# Patient Record
Sex: MALE | Race: WHITE | NOT HISPANIC OR LATINO | URBAN - METROPOLITAN AREA
[De-identification: names, ages, dates, MRNs, and addresses within clinical notes are randomized per-mention and may not be internally consistent; named-entity substitution may affect disease eponyms.]

---

## 2019-03-12 ENCOUNTER — INPATIENT (INPATIENT)
Facility: HOSPITAL | Age: 49
LOS: 2 days | Discharge: HOME | End: 2019-03-15
Attending: INTERNAL MEDICINE | Admitting: INTERNAL MEDICINE

## 2019-03-12 VITALS
SYSTOLIC BLOOD PRESSURE: 125 MMHG | OXYGEN SATURATION: 97 % | RESPIRATION RATE: 20 BRPM | DIASTOLIC BLOOD PRESSURE: 73 MMHG | HEART RATE: 42 BPM | TEMPERATURE: 96 F

## 2019-03-12 DIAGNOSIS — Z98.890 OTHER SPECIFIED POSTPROCEDURAL STATES: Chronic | ICD-10-CM

## 2019-03-12 LAB
ALBUMIN SERPL ELPH-MCNC: 4.5 G/DL — SIGNIFICANT CHANGE UP (ref 3.5–5.2)
ALP SERPL-CCNC: 62 U/L — SIGNIFICANT CHANGE UP (ref 30–115)
ALT FLD-CCNC: 21 U/L — SIGNIFICANT CHANGE UP (ref 0–41)
ANION GAP SERPL CALC-SCNC: 9 MMOL/L — SIGNIFICANT CHANGE UP (ref 7–14)
AST SERPL-CCNC: 26 U/L — SIGNIFICANT CHANGE UP (ref 0–41)
BASE EXCESS BLDV CALC-SCNC: 1 MMOL/L — SIGNIFICANT CHANGE UP (ref -2–2)
BASOPHILS # BLD AUTO: 0.03 K/UL — SIGNIFICANT CHANGE UP (ref 0–0.2)
BASOPHILS NFR BLD AUTO: 0.6 % — SIGNIFICANT CHANGE UP (ref 0–1)
BILIRUB SERPL-MCNC: 0.5 MG/DL — SIGNIFICANT CHANGE UP (ref 0.2–1.2)
BUN SERPL-MCNC: 23 MG/DL — HIGH (ref 10–20)
CA-I SERPL-SCNC: SIGNIFICANT CHANGE UP MMOL/L (ref 1.12–1.3)
CALCIUM SERPL-MCNC: 9.1 MG/DL — SIGNIFICANT CHANGE UP (ref 8.5–10.1)
CHLORIDE SERPL-SCNC: 104 MMOL/L — SIGNIFICANT CHANGE UP (ref 98–110)
CO2 SERPL-SCNC: 24 MMOL/L — SIGNIFICANT CHANGE UP (ref 17–32)
CREAT SERPL-MCNC: 1.1 MG/DL — SIGNIFICANT CHANGE UP (ref 0.7–1.5)
D DIMER BLD IA.RAPID-MCNC: 134 NG/ML DDU — SIGNIFICANT CHANGE UP (ref 0–230)
EOSINOPHIL # BLD AUTO: 0.02 K/UL — SIGNIFICANT CHANGE UP (ref 0–0.7)
EOSINOPHIL NFR BLD AUTO: 0.4 % — SIGNIFICANT CHANGE UP (ref 0–8)
GAS PNL BLDV: 139 MMOL/L — SIGNIFICANT CHANGE UP (ref 136–145)
GAS PNL BLDV: SIGNIFICANT CHANGE UP
GLUCOSE SERPL-MCNC: 162 MG/DL — HIGH (ref 70–99)
HCO3 BLDV-SCNC: 29 MMOL/L — SIGNIFICANT CHANGE UP (ref 22–29)
HCT VFR BLD CALC: 43.8 % — SIGNIFICANT CHANGE UP (ref 42–52)
HCT VFR BLDA CALC: 47.1 % — HIGH (ref 34–44)
HGB BLD CALC-MCNC: 15.4 G/DL — SIGNIFICANT CHANGE UP (ref 14–18)
HGB BLD-MCNC: 14.7 G/DL — SIGNIFICANT CHANGE UP (ref 14–18)
IMM GRANULOCYTES NFR BLD AUTO: 0.2 % — SIGNIFICANT CHANGE UP (ref 0.1–0.3)
LACTATE BLDV-MCNC: SIGNIFICANT CHANGE UP MMOL/L (ref 0.5–1.6)
LYMPHOCYTES # BLD AUTO: 0.75 K/UL — LOW (ref 1.2–3.4)
LYMPHOCYTES # BLD AUTO: 14.9 % — LOW (ref 20.5–51.1)
MAGNESIUM SERPL-MCNC: 1.8 MG/DL — SIGNIFICANT CHANGE UP (ref 1.8–2.4)
MCHC RBC-ENTMCNC: 29.9 PG — SIGNIFICANT CHANGE UP (ref 27–31)
MCHC RBC-ENTMCNC: 33.6 G/DL — SIGNIFICANT CHANGE UP (ref 32–37)
MCV RBC AUTO: 89.2 FL — SIGNIFICANT CHANGE UP (ref 80–94)
MONOCYTES # BLD AUTO: 0.32 K/UL — SIGNIFICANT CHANGE UP (ref 0.1–0.6)
MONOCYTES NFR BLD AUTO: 6.3 % — SIGNIFICANT CHANGE UP (ref 1.7–9.3)
NEUTROPHILS # BLD AUTO: 3.92 K/UL — SIGNIFICANT CHANGE UP (ref 1.4–6.5)
NEUTROPHILS NFR BLD AUTO: 77.6 % — HIGH (ref 42.2–75.2)
NRBC # BLD: 0 /100 WBCS — SIGNIFICANT CHANGE UP (ref 0–0)
NT-PROBNP SERPL-SCNC: 57 PG/ML — SIGNIFICANT CHANGE UP (ref 0–300)
PCO2 BLDV: 58 MMHG — HIGH (ref 41–51)
PH BLDV: 7.3 — SIGNIFICANT CHANGE UP (ref 7.26–7.43)
PLATELET # BLD AUTO: 192 K/UL — SIGNIFICANT CHANGE UP (ref 130–400)
PO2 BLDV: 24 MMHG — SIGNIFICANT CHANGE UP (ref 20–40)
POTASSIUM BLDV-SCNC: 4.5 MMOL/L — SIGNIFICANT CHANGE UP (ref 3.3–5.6)
POTASSIUM SERPL-MCNC: 5 MMOL/L — SIGNIFICANT CHANGE UP (ref 3.5–5)
POTASSIUM SERPL-SCNC: 5 MMOL/L — SIGNIFICANT CHANGE UP (ref 3.5–5)
PROT SERPL-MCNC: 6.8 G/DL — SIGNIFICANT CHANGE UP (ref 6–8)
RBC # BLD: 4.91 M/UL — SIGNIFICANT CHANGE UP (ref 4.7–6.1)
RBC # FLD: 12.5 % — SIGNIFICANT CHANGE UP (ref 11.5–14.5)
SAO2 % BLDV: 40 % — SIGNIFICANT CHANGE UP
SODIUM SERPL-SCNC: 137 MMOL/L — SIGNIFICANT CHANGE UP (ref 135–146)
TROPONIN T SERPL-MCNC: <0.01 NG/ML — SIGNIFICANT CHANGE UP
WBC # BLD: 5.05 K/UL — SIGNIFICANT CHANGE UP (ref 4.8–10.8)
WBC # FLD AUTO: 5.05 K/UL — SIGNIFICANT CHANGE UP (ref 4.8–10.8)

## 2019-03-12 RX ORDER — ACETAMINOPHEN 500 MG
650 TABLET ORAL EVERY 6 HOURS
Qty: 0 | Refills: 0 | Status: DISCONTINUED | OUTPATIENT
Start: 2019-03-12 | End: 2019-03-15

## 2019-03-12 RX ORDER — SODIUM CHLORIDE 9 MG/ML
1000 INJECTION, SOLUTION INTRAVENOUS ONCE
Qty: 0 | Refills: 0 | Status: COMPLETED | OUTPATIENT
Start: 2019-03-12 | End: 2019-03-12

## 2019-03-12 RX ORDER — ENOXAPARIN SODIUM 100 MG/ML
40 INJECTION SUBCUTANEOUS EVERY 24 HOURS
Qty: 0 | Refills: 0 | Status: DISCONTINUED | OUTPATIENT
Start: 2019-03-12 | End: 2019-03-15

## 2019-03-12 RX ADMIN — ENOXAPARIN SODIUM 40 MILLIGRAM(S): 100 INJECTION SUBCUTANEOUS at 21:47

## 2019-03-12 RX ADMIN — Medication 650 MILLIGRAM(S): at 20:10

## 2019-03-12 RX ADMIN — Medication 650 MILLIGRAM(S): at 21:26

## 2019-03-12 RX ADMIN — SODIUM CHLORIDE 2000 MILLILITER(S): 9 INJECTION, SOLUTION INTRAVENOUS at 10:46

## 2019-03-12 NOTE — ED ADULT NURSE NOTE - NSIMPLEMENTINTERV_GEN_ALL_ED
Implemented All Fall with Harm Risk Interventions:  [place] to call system. Call bell, personal items and telephone within reach. Instruct patient to call for assistance. Room bathroom lighting operational. Non-slip footwear when patient is off stretcher. Physically safe environment: no spills, clutter or unnecessary equipment. Stretcher in lowest position, wheels locked, appropriate side rails in place. Provide visual cue, wrist band, yellow gown, etc. Monitor gait and stability. Monitor for mental status changes and reorient to person, place, and time. Review medications for side effects contributing to fall risk. Reinforce activity limits and safety measures with patient and family. Provide visual clues: red socks.

## 2019-03-12 NOTE — H&P ADULT - HISTORY OF PRESENT ILLNESS
This is a 48 Yo M, [facility] syncopal episodes 2015 and 2003 who presents for 3 episodes of LOC this AM. He states he was urinating soon after waking up this morning when he suddenly found himself on the floor of the bathroom, he thinks it was approximately 1 minute later. He denies any prodromal symptoms, any urinary or fecal incontinence, postictal confusion, and tongue biting. A few hours later he was taking a shower when he found himself of the floor of the shower, which was similar to the initial episode. 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 He denies any seizure-like movements, but reports that after a minute he came to but was mildly confused, and wasn't sure what was happening. After a few minutes he was more coherent and they came to the hospital.   The patient denies any recent illness, sick contacts, fevers, chills, cough, SOB, chest pain, SOB/MASTERSON, palpitations, abdominal pain, nausea, vomiting diarrhea, LE edema, urinary complaints, neurological complaints, headaches, dizziness, and denies any family history of CAD, sudden cardiac death or passing out, drowning accidents. He takes no medications, supplements, drinks red bull daily but otherwise no excessive caffeine. In the ED was found to have bradycardia 30s-40s, and ECG showed bradycardia 1st deg AV block. Currently the pt is asymptomatic and denies any and all complaints.    Vital Signs Last 24 Hrs  T(C): 36.7 (12 Mar 2019 15:24), Max: 36.7 (12 Mar 2019 15:24)  T(F): 98 (12 Mar 2019 15:24), Max: 98 (12 Mar 2019 15:24)  HR: 66 (12 Mar 2019 15:24) (42 - 66)  BP: 121/62 (12 Mar 2019 15:24) (121/62 - 125/73)  BP(mean): --  RR: 18 (12 Mar 2019 15:24) (18 - 20)  SpO2: 99% (12 Mar 2019 15:24) (97% - 99%)

## 2019-03-12 NOTE — H&P ADULT - NSHPPHYSICALEXAM_GEN_ALL_CORE
PHYSICAL EXAM:  GENERAL: Middle aged M, sitting in bed, well appearing, in NAD  HEAD:  Atraumatic, Normocephalic  EYES: EOMI, PERRLA, conjunctiva and sclera clear  NECK: Supple, No JVD, no carotid bruit  CHEST/LUNG: Clear to auscultation bilaterally; No wheeze, rales, rhonchi  HEART: Regular rate and rhythm; No murmurs, rubs, or gallops, S1 S2 present  ABDOMEN: Soft, Nontender, Nondistended; Bowel sounds present  EXTREMITIES:  2+ Peripheral Pulses, No clubbing, cyanosis, or edema  PSYCH: AAOx3  NEUROLOGY: non-focal, CN II-XII intact  SKIN: No rashes or lesions

## 2019-03-12 NOTE — ED PROVIDER NOTE - CLINICAL SUMMARY MEDICAL DECISION MAKING FREE TEXT BOX
pt p/w multiple syncopal episodes in ED. EKG w/ sinus isela, 1 AVB. CTH negative for intracranial pathology. dimer negative - low risk for PE/dissection. trop negative, less likely acs. pt w/ episode of symptomatic bradycardia in ED to 30s, will admit for further w/u, monitoring on tele

## 2019-03-12 NOTE — ED ADULT NURSE NOTE - OBJECTIVE STATEMENT
patient states he fell early this morning while getting ready for work, does not remember why. also states while at work he was sitting in the car and passed out. episode was witnessed by coworker who states patient's eyes rolled and back and patient became unresponsive for approx one minute. patient does not remember anything except waking up. patient is denying chest pain/sob

## 2019-03-12 NOTE — ED PROVIDER NOTE - NS ED ROS FT
Constitutional: NAD  Eyes: No visual changes, eye pain or discharge.  ENMT: No hearing changes, pain, discharge or infections. No neck pain or stiffness.  Cardiac: No chest pain, SOB or edema. No chest pain with exertion.  Respiratory: No cough or respiratory distress.   GI: No nausea, vomiting, diarrhea or abdominal pain.  : No dysuria, frequency or burning.  MS: No myalgia, muscle weakness, joint pain or back pain.  Neuro: No headache or weakness. + LOC.  Skin: No skin rash.  Heme: No bruising

## 2019-03-12 NOTE — ED PROVIDER NOTE - OBJECTIVE STATEMENT
49M no significant pmh p/f 3 syncopal episodes this morning. Pt reports syncopizing while going to the bathrroom early this morning, then again while in the shower and for a third time while sitting in the passenger seat of his car after picking up his boss for work. Pt states he hurt his L foot after falling in the shower but doesn't think he struck his head. Pts boss reports that pt lost consciousness for 1-2 minutes in the car and that he was confused for approximately 10-15 after as he drove him to the hospital. Pt denies prodromal symptoms, f/c, HA, neck pain, CP, SOB, abd pain, back pain, calf pain/swelling, hx of seizures,  hx of sudden death in family

## 2019-03-12 NOTE — ED ADULT TRIAGE NOTE - CHIEF COMPLAINT QUOTE
Pt had 2 syncopal episodes today, unwitnessed, once in shower and once while driving. Pt pale, diaphoretic.

## 2019-03-12 NOTE — ED PROVIDER NOTE - ATTENDING CONTRIBUTION TO CARE
50 yo m hx syncope in past (~16 yrs ago and 4 yrs ago) here for syncope. pt w/ 3 episodes of syncope. no prodromal sx. no cp, sob, palpitations, ha. pt states he hurt his L foot when he fell in the shower. no ha/neck pain. pt denies hx sz of focal weakness. pt did feel "confused for 10 minutes." no recent flight/travel/calf swelling or tenderness    vss  gen- NAD, aaox3  card-rrr  lungs-ctab, no wheezing or rhonchi  abd-sntnd, no guarding or rebound  neuro- full str/sensation, cn ii-xii grossly intact, normal coordination    r/o acs, lyte abnormality anemic, intracraneal process

## 2019-03-12 NOTE — H&P ADULT - NSHPSOCIALHISTORY_GEN_ALL_CORE
Denies smoking, drinking, drug abuse.  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

## 2019-03-12 NOTE — ED PROVIDER NOTE - PHYSICAL EXAMINATION
General: AOx4, Non toxic appearing, NAD, speaking in full sentences.   Skin: Pale, warm and dry, no acute rash.   Head:  Normocephalic, atraumatic.   EENT: PERRL/EOMI, conjunctiva and sclera clear. MM moist, no nasal discharge.   Neck: Supple nt, no meningeal signs. No midline ttp.   Lymph: No acute cervical lymphadenopathy  Heart isela, regular, s1s2 nl, no rub/murmur.   Lungs- No retractions, BS equal, CTAB.   Abdomen: Soft ntnd no r/g.   Extremities- moves all, +equal distal pulses, brisk cap refill. No LE edema, calves nttp b/l. small abrasion to medial aspect of L forefoot  Neuro: Sensation wnl, CN 2-12 grossly intact. +5/5 muscle strength throughout.  Psych: Cooperative, appropriate

## 2019-03-12 NOTE — H&P ADULT - ASSESSMENT
48 YO M with PMH of syncopal episodes in the past presents with 3 episodes of LOC. CT head, CXR negative, no witnessed seizure activity although the one episode that was witnessed had a facial grimace and some confusion after the event. Was isela in the ED with first deg  AV block. Labs WNL. Likely arrhythmia, no family Hx of arrhythmias or sudden cardiac death.    1) Loss of consciousness: Likely syncope 2/2 arrhythmias vs other causes of syncope (neuro, orthostatic, cardiac disease...) vs seizures (less likely)   - Tele monitoring  - Cardio c/s (will likely need EP workup for symptomatic bradycardia and possible ppm)  - FU TTE, EEG, repeat ECG, orthostatics  - EPS eval/Neuro eval if above tests are abnormal    VTE PPx  Regular diet  Full code  From home, fully functional

## 2019-03-12 NOTE — H&P ADULT - NSHPLABSRESULTS_GEN_ALL_CORE
14.7   5.05  )-----------( 192      ( 12 Mar 2019 10:58 )             43.8       03-12    137  |  104  |  23<H>  ----------------------------<  162<H>  5.0   |  24  |  1.1    Ca    9.1      12 Mar 2019 10:58  Mg     1.8     03-12    TPro  6.8  /  Alb  4.5  /  TBili  0.5  /  DBili  x   /  AST  26  /  ALT  21  /  AlkPhos  62  03-12            CARDIAC MARKERS ( 12 Mar 2019 10:58 )  x     / <0.01 ng/mL / x     / x     / x          POCT Blood Glucose.: 135 mg/dL (12 Mar 2019 10:49)      10:54 - VBG - pH: 7.30  | pCO2: 58    | pO2: 24    | Lactate: ?1.2       03-12 @ 11:29  134 ng/mL DDU [0 - 230]    < from: CT Head No Cont (03.12.19 @ 11:03) >      IMPRESSION:     No acute fractures, mass effect, midline shift or hemorrhage.     < end of copied text >    < from: Xray Chest 1 View-PORTABLE IMMEDIATE (03.12.19 @ 11:15) >    Impression:      No consolidation, effusion orpneumothorax.    < end of copied text >    < from: 12 Lead ECG (03.12.19 @ 10:20) >      Ventricular Rate 42 BPM    Atrial Rate 42 BPM    P-R Interval 224 ms    QRS Duration 92 ms    Q-T Interval 456 ms    QTC Calculation(Bezet) 380 ms    P Axis 39 degrees    R Axis 33 degrees    T Axis 26 degrees    Diagnosis Line Marked sinus bradycardia with sinus arrhythmia with 1st degree A-V block  Abnormal ECG    < end of copied text >

## 2019-03-12 NOTE — H&P ADULT - ATTENDING COMMENTS
pt seen and examined independently.    49M who presents with syncopal episodes x3.  pt says that the first one was this morning, he was standing and using the bathroom, and the next thing he knew he was on the ground.  The second time was in the shower, the third time he was in a car and this was witnessed by his boss.  His boss reports that pt was out for about 30-40 seconds, and when he came to, was a little confused for about 5 minutes.  no tongue biting or incontinence.  +LOC.  pt denies any symptoms prior to passing out like chest pain, palpitations, or dizziness.  no sob.  Pt also says that about 3.5 years ago, he had a passing out episode and was in the hospital where he had pads placed on him and was shocked a few times.  he says he had a normal ECHO, stress, and tilt table test.  he does report some mild Lt foot pain from falling, but is able to ambulate fine.  In the ER, pt was on the tele monitor and he felt like he was going to pass out, on the monitor the heart rate dropped into the 30's per patient.  no medications/no new meds.  comprehensive ROS otherwise negative except those mentioned in the HPI  PMH/FMHx/SHx as above    VS:  stable, HR low 60's on monitor  GEN:  NAD, otherwise healthy appearing male  HEENT:  EOMI, PERRL, MMM  CVS:  RRR, no MRG, no edema  lungs:  CTA b/l  abd:  soft, NT/ND +bs  neuro:  AOx3, no focal deficits  skin:  grossly intact, no rashes    CXR and head CT non-acute  EKG:  sinus isela 42bpm, 1st degree AVB, no acute ST/TW changes    #recurrent syncope - ?symptomatic bradycardia.  continue tele monitoring, EP eval, ECHO.  check orthostatics.  EEG.  may need event montior    PPx - lovenox    Progress Note Handoff  Pending:  cardiology eval  Patient/Family discussion: d/w patient  Disposition: home pt seen and examined independently.    49M who presents with syncopal episodes x3.  pt says that the first one was this morning, he was standing and using the bathroom, and the next thing he knew he was on the ground.  The second time was in the shower, the third time he was in a car and this was witnessed by his boss.  His boss reports that pt was out for about 30-40 seconds, and when he came to, was a little confused for about 5 minutes.  no tongue biting or incontinence.  +LOC.  pt denies any symptoms prior to passing out like chest pain, palpitations, or dizziness.  no sob.  Pt also says that about 3.5 years ago, he had a passing out episode and was in the hospital where he had pacer pads placed on him, ?felt he was being paced.  he says he had a normal ECHO, stress, and tilt table test.  he does report some mild Lt foot pain from falling, but is able to ambulate fine.  In the ER, pt was on the tele monitor and he felt like he was going to pass out, on the monitor the heart rate dropped into the 30's per patient.  no medications/no new meds.  comprehensive ROS otherwise negative except those mentioned in the HPI  PMH/FMHx/SHx as above    VS:  stable, HR low 60's on monitor  GEN:  NAD, otherwise healthy appearing male  HEENT:  EOMI, PERRL, MMM  CVS:  RRR, no MRG, no edema  lungs:  CTA b/l  abd:  soft, NT/ND +bs  neuro:  AOx3, no focal deficits  skin:  grossly intact, no rashes    CXR and head CT non-acute  EKG:  sinus isela 42bpm, 1st degree AVB, no acute ST/TW changes    #recurrent syncope - ?symptomatic bradycardia.  continue tele monitoring, EP eval, ECHO.  check orthostatics.  EEG.  may need event monitor    PPx - lovenox    Progress Note Handoff  Pending:  cardiology eval  Patient/Family discussion: d/w patient  Disposition: home    total time spent 35min

## 2019-03-13 LAB
ALBUMIN SERPL ELPH-MCNC: 4.1 G/DL — SIGNIFICANT CHANGE UP (ref 3.5–5.2)
ALP SERPL-CCNC: 56 U/L — SIGNIFICANT CHANGE UP (ref 30–115)
ALT FLD-CCNC: 19 U/L — SIGNIFICANT CHANGE UP (ref 0–41)
AMPHET UR-MCNC: NEGATIVE — SIGNIFICANT CHANGE UP
ANION GAP SERPL CALC-SCNC: 12 MMOL/L — SIGNIFICANT CHANGE UP (ref 7–14)
AST SERPL-CCNC: 23 U/L — SIGNIFICANT CHANGE UP (ref 0–41)
BARBITURATES UR SCN-MCNC: NEGATIVE — SIGNIFICANT CHANGE UP
BASOPHILS # BLD AUTO: 0.04 K/UL — SIGNIFICANT CHANGE UP (ref 0–0.2)
BASOPHILS NFR BLD AUTO: 0.6 % — SIGNIFICANT CHANGE UP (ref 0–1)
BENZODIAZ UR-MCNC: NEGATIVE — SIGNIFICANT CHANGE UP
BILIRUB DIRECT SERPL-MCNC: <0.2 MG/DL — SIGNIFICANT CHANGE UP (ref 0–0.2)
BILIRUB INDIRECT FLD-MCNC: >0.5 MG/DL — SIGNIFICANT CHANGE UP (ref 0.2–1.2)
BILIRUB SERPL-MCNC: 0.7 MG/DL — SIGNIFICANT CHANGE UP (ref 0.2–1.2)
BUN SERPL-MCNC: 14 MG/DL — SIGNIFICANT CHANGE UP (ref 10–20)
CALCIUM SERPL-MCNC: 9.2 MG/DL — SIGNIFICANT CHANGE UP (ref 8.5–10.1)
CHLORIDE SERPL-SCNC: 107 MMOL/L — SIGNIFICANT CHANGE UP (ref 98–110)
CK MB CFR SERPL CALC: 1.8 NG/ML — SIGNIFICANT CHANGE UP (ref 0.6–6.3)
CK SERPL-CCNC: 119 U/L — SIGNIFICANT CHANGE UP (ref 0–225)
CO2 SERPL-SCNC: 25 MMOL/L — SIGNIFICANT CHANGE UP (ref 17–32)
COCAINE METAB.OTHER UR-MCNC: NEGATIVE — SIGNIFICANT CHANGE UP
CREAT SERPL-MCNC: 1.1 MG/DL — SIGNIFICANT CHANGE UP (ref 0.7–1.5)
EOSINOPHIL # BLD AUTO: 0.07 K/UL — SIGNIFICANT CHANGE UP (ref 0–0.7)
EOSINOPHIL NFR BLD AUTO: 1 % — SIGNIFICANT CHANGE UP (ref 0–8)
GLUCOSE SERPL-MCNC: 94 MG/DL — SIGNIFICANT CHANGE UP (ref 70–99)
HCT VFR BLD CALC: 43.6 % — SIGNIFICANT CHANGE UP (ref 42–52)
HGB BLD-MCNC: 14.6 G/DL — SIGNIFICANT CHANGE UP (ref 14–18)
IMM GRANULOCYTES NFR BLD AUTO: 0.1 % — SIGNIFICANT CHANGE UP (ref 0.1–0.3)
LYMPHOCYTES # BLD AUTO: 1.93 K/UL — SIGNIFICANT CHANGE UP (ref 1.2–3.4)
LYMPHOCYTES # BLD AUTO: 28.5 % — SIGNIFICANT CHANGE UP (ref 20.5–51.1)
MAGNESIUM SERPL-MCNC: 2 MG/DL — SIGNIFICANT CHANGE UP (ref 1.8–2.4)
MCHC RBC-ENTMCNC: 29.9 PG — SIGNIFICANT CHANGE UP (ref 27–31)
MCHC RBC-ENTMCNC: 33.5 G/DL — SIGNIFICANT CHANGE UP (ref 32–37)
MCV RBC AUTO: 89.3 FL — SIGNIFICANT CHANGE UP (ref 80–94)
METHADONE UR-MCNC: NEGATIVE — SIGNIFICANT CHANGE UP
MONOCYTES # BLD AUTO: 0.59 K/UL — SIGNIFICANT CHANGE UP (ref 0.1–0.6)
MONOCYTES NFR BLD AUTO: 8.7 % — SIGNIFICANT CHANGE UP (ref 1.7–9.3)
NEUTROPHILS # BLD AUTO: 4.13 K/UL — SIGNIFICANT CHANGE UP (ref 1.4–6.5)
NEUTROPHILS NFR BLD AUTO: 61.1 % — SIGNIFICANT CHANGE UP (ref 42.2–75.2)
NRBC # BLD: 0 /100 WBCS — SIGNIFICANT CHANGE UP (ref 0–0)
OPIATES UR-MCNC: NEGATIVE — SIGNIFICANT CHANGE UP
PCP SPEC-MCNC: SIGNIFICANT CHANGE UP
PLATELET # BLD AUTO: 189 K/UL — SIGNIFICANT CHANGE UP (ref 130–400)
POTASSIUM SERPL-MCNC: 4.3 MMOL/L — SIGNIFICANT CHANGE UP (ref 3.5–5)
POTASSIUM SERPL-SCNC: 4.3 MMOL/L — SIGNIFICANT CHANGE UP (ref 3.5–5)
PROPOXYPHENE QUALITATIVE URINE RESULT: NEGATIVE — SIGNIFICANT CHANGE UP
PROT SERPL-MCNC: 6.2 G/DL — SIGNIFICANT CHANGE UP (ref 6–8)
RBC # BLD: 4.88 M/UL — SIGNIFICANT CHANGE UP (ref 4.7–6.1)
RBC # FLD: 12.6 % — SIGNIFICANT CHANGE UP (ref 11.5–14.5)
SODIUM SERPL-SCNC: 144 MMOL/L — SIGNIFICANT CHANGE UP (ref 135–146)
TROPONIN T SERPL-MCNC: <0.01 NG/ML — SIGNIFICANT CHANGE UP
WBC # BLD: 6.77 K/UL — SIGNIFICANT CHANGE UP (ref 4.8–10.8)
WBC # FLD AUTO: 6.77 K/UL — SIGNIFICANT CHANGE UP (ref 4.8–10.8)

## 2019-03-13 RX ADMIN — ENOXAPARIN SODIUM 40 MILLIGRAM(S): 100 INJECTION SUBCUTANEOUS at 21:27

## 2019-03-13 NOTE — PROGRESS NOTE ADULT - ASSESSMENT
50 YO M with PMH of syncopal episodes in the past presents with 3 episodes of LOC. CT head, CXR negative, no witnessed seizure activity although the one episode that was witnessed had a facial grimace and some confusion after the event. Was isela in the ED with first deg  AV block. Labs WNL. Likely arrhythmia, no family Hx of arrhythmias or sudden cardiac death.    #Syncope in setting of 1st degree A-V block with bradycardia 40s  -  C/w Tele monitoring at this time  - F/U Cardiology and EP eval  - F/U TTE, EEG.  -CE -ve x2.  -Daily EKGs  -Orthostatic VS  - trending pulse ox overnight (BULMARO?)    VTE PPx: Lovenos SQ  Regular diet  Full code  From home, fully functional 48 YO M with PMH of syncopal episodes in the past presents with 3 episodes of LOC. CT head, CXR negative, no witnessed seizure activity although the one episode that was witnessed had a facial grimace and some confusion after the event. Was isela in the ED with first deg  AV block. Labs WNL. Likely arrhythmia, no family Hx of arrhythmias or sudden cardiac death.    #Syncope in setting of 1st degree A-V block with bradycardia 40s  -  C/w Tele monitoring at this time  - F/U Cardiology and EP eval for possible PPM   - F/U TTE, EEG.  - CE -ve x2.  - Daily EKGs  - Orthostatic VS  - trending pulse ox overnight (BULMARO?)    VTE PPx: Lovenos SQ  Regular diet  Full code  From home, fully functional    #Progress Note Handoff  Pending (specify):  Consults___EP ______, Tests________, Test Results_______, Other_________  Family discussion:discussed with patient who is alert and oriented afgrees with above plan   Disposition: Home_##__/SNF___/Other________/Unknown at this time________

## 2019-03-13 NOTE — CONSULT NOTE ADULT - SUBJECTIVE AND OBJECTIVE BOX
HPI:  This is a 50 Yo M, [facility] syncopal episodes 2015 and 2003 who presents for 3 episodes of LOC yesterday. He states he was urinating soon after waking up this morning when he suddenly found himself on the floor of the bathroom, he thinks it was approximately 1 minute later. He denies any prodromal symptoms, any urinary or fecal incontinence, postictal confusion, and tongue biting. A few hours later he was taking a shower when he found himself of the floor of the shower, which was similar to the initial episode. 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 He denies any seizure-like movements, but reports that after a minute he came to but was mildly confused, and wasn't sure what was happening. After a few minutes he was more coherent and they came to the hospital.   The patient denies any recent illness, sick contacts, fevers, chills, cough, SOB, chest pain, SOB/MASTERSON, palpitations, abdominal pain, nausea, vomiting diarrhea, LE edema, urinary complaints, neurological complaints, headaches, dizziness, and denies any family history of CAD, sudden cardiac death or passing out, drowning accidents. He takes no medications, supplements, drinks red bull daily but otherwise no excessive caffeine. In the ED was found to have bradycardia 30s-40s, and ECG showed bradycardia 1st deg AV block. Currently the pt is asymptomatic and denies any and all complaints.    REEG done this afternoon, result pending    Family Hx-father stroke at 72 y/o      Neuro Exam:  Orientation: oriented to person, oriented to place and oriented to time.   Language: no difficulty naming common objects, no difficulty repeating a phrase.  Cranial Nerves: visual acuity intact bilaterally, visual fields full to confrontation, pupils equal round and reactive to light, extraocular motion intact, facial sensation intact symmetrically, face symmetrical, hearing was intact bilaterally, tongue and palate midline, head turning and shoulder shrug symmetric and there was no tongue deviation with protrusion.   Motor: muscle tone was normal in all four extremities, muscle strength was normal in all four extremities and normal bulk in all four extremities.   Sensory exam: light touch was intact.   Coordination:. there was no past-pointing. no tremor present.   Deep tendon reflexes:   Biceps right 2+. Biceps left 2+.    Triceps right 2+. Triceps left 2+.  LOC  Brachioradialis right 2+. Brachioradialis left 2+.    Patella right 2+. Patella left 2+.    Ankle jerk right 2+. Ankle jerk left 2+.         Allergies    No Known Allergies    Intolerances      MEDICATIONS  (STANDING):  enoxaparin Injectable 40 milliGRAM(s) SubCutaneous every 24 hours    MEDICATIONS  (PRN):  acetaminophen   Tablet .. 650 milliGRAM(s) Oral every 6 hours PRN Mild Pain (1 - 3)        LABS:                        14.6   6.77  )-----------( 189      ( 13 Mar 2019 05:36 )             43.6     03-13    144  |  107  |  14  ----------------------------<  94  4.3   |  25  |  1.1    Ca    9.2      13 Mar 2019 05:36  Mg     2.0     03-13    TPro  6.2  /  Alb  4.1  /  TBili  0.7  /  DBili  <0.2  /  AST  23  /  ALT  19  /  AlkPhos  56  03-13    Neuro Imaging:    < from: CT Head No Cont (03.12.19 @ 11:03) >  Findings: The ventricles, basal cisterns and sulcal pattern are within   normal limits for patient's stated age.  There are no cerebral,   cerebellar or mid brain parenchymal abnormalities.  There is no acute   mass effect, midline shift or hemorrhage.  No extra-axial fluid   collections are identified.    There are no acute fractures or dislocations. The bones of the calvarium   are intact.  The paranasal sinuses, bilateral mastoid complexes, globes   and orbits are grossly within normal limits.    Beam hardening artifact is noted overlying the brain stem and posterior   fossa which is inherent to CT in this location.      IMPRESSION:     No acute fractures, mass effect, midline shift or hemorrhage.     < end of copied text >

## 2019-03-13 NOTE — CONSULT NOTE ADULT - ASSESSMENT
Impression:  50 y/o male with multiple episodes of syncope dating back to 2003. Had three episodes yesterday. HR noted to be in the 30's on tele. CTH negative    Plan:  cardiology f/u  f/u on REEG

## 2019-03-13 NOTE — CONSULT NOTE ADULT - SUBJECTIVE AND OBJECTIVE BOX
Patient is a 49y old  Male who presents with a chief complaint of Syncopal episodes (12 Mar 2019 15:04)    HPI:  This is a 48 Yo M, PMH syncopal episodes 2015 and 2003 who presents for 3 episodes of LOC this AM. He states he was urinating soon after waking up this morning when he suddenly found himself on the floor of the bathroom, he thinks it was approximately 1 minute later. He denies any prodromal symptoms, any urinary or fecal incontinence, postictal confusion, and tongue biting. A few hours later he was taking a shower when he found himself of the floor of the shower, which was similar to the initial episode. 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 He denies any seizure-like movements, but reports that after a minute he came to but was mildly confused, and wasn't sure what was happening. After a few minutes he was more coherent and they came to the hospital.   The patient denies any recent illness, sick contacts, fevers, chills, cough, SOB, chest pain, SOB/MASTERSON, palpitations, abdominal pain, nausea, vomiting diarrhea, LE edema, urinary complaints, neurological complaints, headaches, dizziness, and denies any family history of CAD, sudden cardiac death or passing out, drowning accidents. He takes no medications, supplements, drinks red bull daily but otherwise no excessive caffeine. In the ED was found to have bradycardia 30s-40s, and ECG showed bradycardia 1st deg AV block. Currently the pt is asymptomatic and denies any and all complaints.    REVIEW OF SYSTEMS    [ ] A ten-point review of systems was otherwise negative except as noted.  [ ] Due to altered mental status/intubation, subjective information were not able to be obtained from the patient. History was obtained, to the extent possible, from review of the chart and collateral sources of information.    PAST MEDICAL & SURGICAL HISTORY:  Syncopal episodes  H/O knee surgery    Home Medications:    Allergies:    No Known Allergies      PREVIOUS DIAGNOSTIC TESTING:      ECHO  FINDINGS:    STRESS  FINDINGS:    CATHETERIZATION  FINDINGS:    ELECTROPHYSIOLOGY STUDY  FINDINGS:    CAROTID ULTRASOUND:  FINDINGS    VENOUS DUPLEX SCAN:  FINDINGS:    CHEST CT PULMONARY ANGIO with IV Contrast:  FINDINGS:    FAMILY HISTORY:  No pertinent family history in first degree relatives      SOCIAL HISTORY: denies Tob/ETOH/IVDU      MEDICATIONS  (STANDING):  enoxaparin Injectable 40 milliGRAM(s) SubCutaneous every 24 hours    MEDICATIONS  (PRN):  acetaminophen   Tablet .. 650 milliGRAM(s) Oral every 6 hours PRN Mild Pain (1 - 3)      Vital Signs Last 24 Hrs  T(C): 36.6 (13 Mar 2019 05:32), Max: 36.7 (12 Mar 2019 15:24)  T(F): 97.9 (13 Mar 2019 05:32), Max: 98 (12 Mar 2019 15:24)  HR: 66 (12 Mar 2019 19:01) (42 - 66)  BP: 131/60 (12 Mar 2019 19:01) (121/62 - 131/60)  BP(mean): --  RR: 18 (13 Mar 2019 05:32) (18 - 20)  SpO2: 99% (12 Mar 2019 15:24) (97% - 99%)      PHYSICAL EXAM:    GENERAL: In no apparent distress, well nourished, and hydrated.  HEAD:  Atraumatic, Normocephalic  EYES: EOMI, PERRLA, conjunctiva and sclera clear  ENMT: No tonsillar erythema, exudates, or enlargements; ist mucous membranes, Good dentition, No lesions  NECK: Supple and normal thyroid.  No JVD or carotid bruit.  Carotid pulse is 2+ bilaterally.  HEART: Regular rate and rhythm; No murmurs, rubs, or gallops.  PULMONARY: Clear to auscultation and perfusion.  No rales, wheezing, or rhonchi bilaterally.  ABDOMEN: Soft, Nontender, Nondistended; Bowel sounds present  EXTREMITIES:  2+ Peripheral Pulses, No clubbing, cyanosis, or edema  LYMPH: No lymphadenopathy noted  NEUROLOGICAL: Grossly nonfocal      INTERPRETATION OF TELEMETRY:    ECG:  < from: 12 Lead ECG (03.12.19 @ 10:20) >  Ventricular Rate 42 BPM    Atrial Rate 42 BPM    P-R Interval 224 ms    QRS Duration 92 ms    Q-T Interval 456 ms    QTC Calculation(Bezet) 380 ms    P Axis 39 degrees    R Axis 33 degrees    T Axis 26 degrees    Diagnosis Line Marked sinus bradycardia with sinus arrhythmia with 1st degree A-V block  Abnormal ECG    < end of copied text >      I&O's Detail      LABS:                        14.6   6.77  )-----------( 189      ( 13 Mar 2019 05:36 )             43.6     03-13    144  |  107  |  14  ----------------------------<  94  4.3   |  25  |  1.1    Ca    9.2      13 Mar 2019 05:36  Mg     2.0     03-13    TPro  6.2  /  Alb  4.1  /  TBili  0.7  /  DBili  <0.2  /  AST  23  /  ALT  19  /  AlkPhos  56  03-13    CARDIAC MARKERS ( 13 Mar 2019 01:11 )  x     / <0.01 ng/mL / 119 U/L / x     / 1.8 ng/mL  CARDIAC MARKERS ( 12 Mar 2019 10:58 )  x     / <0.01 ng/mL / x     / x     / x              BNPSerum Pro-Brain Natriuretic Peptide: 57 pg/mL (03-12 @ 10:58)    I&O's Detail    Daily Height in cm: 185.42 (12 Mar 2019 19:01)    Daily     RADIOLOGY & ADDITIONAL STUDIES: Patient is a 49y old  Male who presents with a chief complaint of Syncopal episodes (12 Mar 2019 15:04)    HPI:  This is a 50 Yo M, PMH syncopal episodes 2015 and 2003 who presents for 3 episodes of LOC this AM. He states he was urinating soon after waking up this morning when he suddenly found himself on the floor of the bathroom, he thinks it was approximately 1 minute later. He denies any prodromal symptoms, any urinary or fecal incontinence, postictal confusion, and tongue biting. A few hours later he was taking a shower when he found himself of the floor of the shower, which was similar to the initial episode. 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 He denies any seizure-like movements, but reports that after a minute he came to but was mildly confused, and wasn't sure what was happening. After a few minutes he was more coherent and they came to the hospital.   The patient denies any recent illness, sick contacts, fevers, chills, cough, SOB, chest pain, SOB/MASTERSON, palpitations, abdominal pain, nausea, vomiting diarrhea, LE edema, urinary complaints, neurological complaints, headaches, dizziness, and denies any family history of CAD, sudden cardiac death or passing out, drowning accidents. He takes no medications, supplements, drinks red bull daily but otherwise no excessive caffeine. In the ED was found to have bradycardia 30s-40s, and ECG showed bradycardia 1st deg AV block. Currently the pt is asymptomatic and denies any and all complaints.    Patient reports similar event 4yrs ago, several syncopal episodes. Had work up at that time as out-pt, including Tilt test (@[facility]), Echo and exercise stress test that were reportedly normal. Denies any repeat events since that time until now. Denies any associated symptoms like dizziness, lightheadedness visual changes, chest discomfort dyspnea, any preceding events leading to syncope, denies any recent illnesses or new/OTC meds. Patient is physically active, , runs daily. However patient admits to worsening anxiety recently, similar as 4yrs ago (both of his parents passed away within 4months).     REVIEW OF SYSTEMS    [x ] A ten-point review of systems was otherwise negative except as noted.    PAST MEDICAL & SURGICAL HISTORY:  Syncopal episodes  H/O knee surgery    Home Medications:  denies  ASA prn for HA    Allergies:    No Known Allergies      PREVIOUS DIAGNOSTIC TESTING:      ECHO  FINDINGS:    STRESS  FINDINGS:    CATHETERIZATION  FINDINGS:    ELECTROPHYSIOLOGY STUDY  FINDINGS:    CAROTID ULTRASOUND:  FINDINGS    VENOUS DUPLEX SCAN:  FINDINGS:    CHEST CT PULMONARY ANGIO with IV Contrast:  FINDINGS:    FAMILY HISTORY:  No pertinent family history in first degree relatives      SOCIAL HISTORY: denies Tob/ETOH/IVDU      MEDICATIONS  (STANDING):  enoxaparin Injectable 40 milliGRAM(s) SubCutaneous every 24 hours    MEDICATIONS  (PRN):  acetaminophen   Tablet .. 650 milliGRAM(s) Oral every 6 hours PRN Mild Pain (1 - 3)      Vital Signs Last 24 Hrs  T(C): 36.6 (13 Mar 2019 05:32), Max: 36.7 (12 Mar 2019 15:24)  T(F): 97.9 (13 Mar 2019 05:32), Max: 98 (12 Mar 2019 15:24)  HR: 66 (12 Mar 2019 19:01) (42 - 66)  BP: 131/60 (12 Mar 2019 19:01) (121/62 - 131/60)  BP(mean): --  RR: 18 (13 Mar 2019 05:32) (18 - 20)  SpO2: 99% (12 Mar 2019 15:24) (97% - 99%)      PHYSICAL EXAM:    GENERAL: In no apparent distress, well nourished, and hydrated.  HEAD:  Atraumatic, Normocephalic  EYES: EOMI, PERRLA, conjunctiva and sclera clear  NECK: Supple and normal thyroid.  No JVD or carotid bruit.  Carotid pulse is 2+ bilaterally.  HEART: bradycardic, Regular rrhythm, No murmurs, rubs, or gallops.  PULMONARY: Clear to auscultation and perfusion.  No rales, wheezing, or rhonchi bilaterally.  ABDOMEN: Soft, Nontender, Nondistended; Bowel sounds present  EXTREMITIES:  2+ Peripheral Pulses, No clubbing, cyanosis, or edema  NEUROLOGICAL: Grossly nonfocal, a&o x3      INTERPRETATION OF TELEMETRY:  sinus isela 42-62 with 1st degree AVB    ECG:  < from: 12 Lead ECG (03.12.19 @ 10:20) >  Ventricular Rate 42 BPM    Atrial Rate 42 BPM    P-R Interval 224 ms    QRS Duration 92 ms    Q-T Interval 456 ms    QTC Calculation(Bezet) 380 ms    P Axis 39 degrees    R Axis 33 degrees    T Axis 26 degrees    Diagnosis Line Marked sinus bradycardia with sinus arrhythmia with 1st degree A-V block  Abnormal ECG    < end of copied text >      I&O's Detail      LABS:                        14.6   6.77  )-----------( 189      ( 13 Mar 2019 05:36 )             43.6     03-13    144  |  107  |  14  ----------------------------<  94  4.3   |  25  |  1.1    Ca    9.2      13 Mar 2019 05:36  Mg     2.0     03-13    TPro  6.2  /  Alb  4.1  /  TBili  0.7  /  DBili  <0.2  /  AST  23  /  ALT  19  /  AlkPhos  56  03-13    CARDIAC MARKERS ( 13 Mar 2019 01:11 )  x     / <0.01 ng/mL / 119 U/L / x     / 1.8 ng/mL  CARDIAC MARKERS ( 12 Mar 2019 10:58 )  x     / <0.01 ng/mL / x     / x     / x              BNPSerum Pro-Brain Natriuretic Peptide: 57 pg/mL (03-12 @ 10:58)    I&O's Detail    Daily Height in cm: 185.42 (12 Mar 2019 19:01)    Daily     RADIOLOGY & ADDITIONAL STUDIES:  < from: Xray Chest 1 View-PORTABLE IMMEDIATE (03.12.19 @ 11:15) >  Impression:      No consolidation, effusion orpneumothorax.    < end of copied text >

## 2019-03-13 NOTE — CONSULT NOTE ADULT - ATTENDING COMMENTS
Patient seen and examined- agree with note above-    Patient reports 7 episodes of syncope- 3 episodes 14 years ago, 1 episode 4-5 yrs ago- and 3 yesterday-  patient is an athlete- a runner-    the episode withnessed by his boss- he was sitting in the car- he slipped back. eyes rolled- and became unresponsive for few minutes- no convulsive movements witnessed-    pulse rate was in 30's and 40's in the hospital    no hist of head trauma/ stroke/ brain tumor  no other neurologic complaints    A/P:  SYmptomatic bradycardia-    seizure seems unlikely- medical team wishes to proceed with 24 hr video EEG
Recurrent Syncope  no preceding prodromes  r/o arrhythmias    Recommend:  -Cardiac MRI  -CT coronaries  -Exercise stress test to check for arrhythmias on exertion  -Tilt table test  -Will follow patient with you for further work-up

## 2019-03-13 NOTE — PROGRESS NOTE ADULT - SUBJECTIVE AND OBJECTIVE BOX
HIGINIO SCHAFER 49y Male  MRN#: [id number]         SUBJECTIVE  Patient is a 49y old Male who presents with a chief complaint of Syncopal episodes (13 Mar 2019 09:16)  Currently admitted to medicine with the primary diagnosis of Syncope    Today is hospital day 1d, and this morning pt is resting comfortably. He reports no lightheadedness, dizziness, nausea, diaphoresis prior to the episode. His  last syncopal episode was in 2014/2015, he was admitted to [facility], and reports having tilt-table set, and 'cardiac work up" that was negative. No episodes since. Says he is feeling fine at this time.       OBJECTIVE  PAST MEDICAL & SURGICAL HISTORY  Syncopal episodes  H/O knee surgery    ALLERGIES:  No Known Allergies    MEDICATIONS:  STANDING MEDICATIONS  enoxaparin Injectable 40 milliGRAM(s) SubCutaneous every 24 hours    PRN MEDICATIONS  acetaminophen   Tablet .. 650 milliGRAM(s) Oral every 6 hours PRN      Home Medications:      VITAL SIGNS: Last 24 Hours  T(C): 36.6 (13 Mar 2019 05:32), Max: 36.7 (12 Mar 2019 15:24)  T(F): 97.9 (13 Mar 2019 05:32), Max: 98 (12 Mar 2019 15:24)  HR: 66 (12 Mar 2019 19:01) (42 - 66)  BP: 131/60 (12 Mar 2019 19:01) (121/62 - 131/60)  BP(mean): --  RR: 18 (13 Mar 2019 05:32) (18 - 20)  SpO2: 99% (12 Mar 2019 15:24) (97% - 99%)    LABS:                        14.6   6.77  )-----------( 189      ( 13 Mar 2019 05:36 )             43.6     03-13    144  |  107  |  14  ----------------------------<  94  4.3   |  25  |  1.1    Ca    9.2      13 Mar 2019 05:36  Mg     2.0     03-13    TPro  6.2  /  Alb  4.1  /  TBili  0.7  /  DBili  <0.2  /  AST  23  /  ALT  19  /  AlkPhos  56  03-13          Creatine Kinase, Serum: 119 U/L (03-13-19 @ 01:11)  Troponin T, Serum: <0.01 ng/mL (03-13-19 @ 01:11)  Troponin T, Serum: <0.01 ng/mL (03-12-19 @ 10:58)      CARDIAC MARKERS ( 13 Mar 2019 01:11 )  x     / <0.01 ng/mL / 119 U/L / x     / 1.8 ng/mL  CARDIAC MARKERS ( 12 Mar 2019 10:58 )  x     / <0.01 ng/mL / x     / x     / x          I&O's Summary        PHYSICAL EXAM:    General: Not in distress.   HEENT: Moist mucus membranes. PERRLA.  Cardio: Regular rate and rhythm, S1, S2, no murmurs  Pulm: Clear to auscultation bilaterally.   Abdomen: Soft, non-tender, non-distended.   Extremities: No cyanosis or edema bilaterally.   Neuro: A&O x3. No focal deficits.       RADIOLOGY:    	No acute fractures, mass effect, midline shift or hemorrhage.     	< end of copied text >    	< from: Xray Chest 1 View-PORTABLE IMMEDIATE (03.12.19 @ 11:15) >    	Impression:      	No consolidation, effusion or pneumothorax.    	< end of copied text > HIGINIO SCHAFER 49y Male  MRN#: [id number]         SUBJECTIVE  Patient is a 49y old Male who presents with a chief complaint of Syncopal episodes (13 Mar 2019 09:16)  Currently admitted to medicine with the primary diagnosis of Syncope    Today is hospital day 1d, and this morning pt is resting comfortably. He reports no lightheadedness, dizziness, nausea, diaphoresis prior to the episode. His  last syncopal episode was in 2014/2015, he was admitted to [facility], and reports having tilt-table set, and 'cardiac work up" that was negative. No episodes since. Says he is feeling fine at this time.       OBJECTIVE  PAST MEDICAL & SURGICAL HISTORY  Syncopal episodes  H/O knee surgery    ALLERGIES:  No Known Allergies    MEDICATIONS:  STANDING MEDICATIONS  enoxaparin Injectable 40 milliGRAM(s) SubCutaneous every 24 hours    PRN MEDICATIONS  acetaminophen   Tablet .. 650 milliGRAM(s) Oral every 6 hours PRN      Home Medications:      VITAL SIGNS: Last 24 Hours  T(C): 36.6 (13 Mar 2019 05:32), Max: 36.7 (12 Mar 2019 15:24)  T(F): 97.9 (13 Mar 2019 05:32), Max: 98 (12 Mar 2019 15:24)  HR: 66 (12 Mar 2019 19:01) (42 - 66)  BP: 131/60 (12 Mar 2019 19:01) (121/62 - 131/60)  BP(mean): --  RR: 18 (13 Mar 2019 05:32) (18 - 20)  SpO2: 99% (12 Mar 2019 15:24) (97% - 99%)    LABS:                        14.6   6.77  )-----------( 189      ( 13 Mar 2019 05:36 )             43.6     03-13    144  |  107  |  14  ----------------------------<  94  4.3   |  25  |  1.1    Ca    9.2      13 Mar 2019 05:36  Mg     2.0     03-13    TPro  6.2  /  Alb  4.1  /  TBili  0.7  /  DBili  <0.2  /  AST  23  /  ALT  19  /  AlkPhos  56  03-13          Creatine Kinase, Serum: 119 U/L (03-13-19 @ 01:11)  Troponin T, Serum: <0.01 ng/mL (03-13-19 @ 01:11)  Troponin T, Serum: <0.01 ng/mL (03-12-19 @ 10:58)      CARDIAC MARKERS ( 13 Mar 2019 01:11 )  x     / <0.01 ng/mL / 119 U/L / x     / 1.8 ng/mL  CARDIAC MARKERS ( 12 Mar 2019 10:58 )  x     / <0.01 ng/mL / x     / x     / x          I&O's Summary        PHYSICAL EXAM:    General: Not in distress.   HEENT: Moist mucus membranes. PERRLA.  Cardio: Regular rate and rhythm, S1, S2, no murmurs  Pulm: Clear to auscultation bilaterally.   GI: Soft, non-tender, non-distended.   Extremities: No cyanosis or edema bilaterally.   Neuro: A&O x3. No focal deficits.       RADIOLOGY:    	No acute fractures, mass effect, midline shift or hemorrhage.     	< end of copied text >    	< from: Xray Chest 1 View-PORTABLE IMMEDIATE (03.12.19 @ 11:15) >    	Impression:      	No consolidation, effusion or pneumothorax.    	< end of copied text >

## 2019-03-13 NOTE — CHART NOTE - NSCHARTNOTEFT_GEN_A_CORE
Called by EP.    Ordered the following: CT coronaries, cardiac MRI w/w/o contrast to r/o ARVD, exercise stress test, NPO midnight

## 2019-03-13 NOTE — CONSULT NOTE ADULT - ASSESSMENT
48yo Male with no significant PMH, admitted with several syncopal episodes, found to be bradycardic on EKG/tele    symptomatic bradycardia  -con't tele  -daily EKG  -avoid AV carlton blockers  -Echo  -check TSH, T3, FT4  -maintain K>4.0, Mg >2.0 48yo Male with no significant PMH, admitted with several syncopal episodes, found to be bradycardic on EKG/tele    symptomatic bradycardia  -con't tele  -daily EKG  -avoid AV carlton blockers  -Echo  -check TSH, T3, FT4  -maintain K>4.0, Mg >2.0  will d/w attending 48yo Male with no significant PMH, admitted with several syncopal episodes, found to be bradycardic on EKG/tele    symptomatic bradycardia  -con't tele  -daily EKG  -avoid AV carlton blockers  -Echo  -check TSH, T3, FT4  -maintain K>4.0, Mg >2.0  Coronary CTA  NPO for CTA  Cardiac MRI to r/o ARVD  Tilt test  EST  EEG done, f/u results

## 2019-03-14 LAB
ALBUMIN SERPL ELPH-MCNC: 4.1 G/DL — SIGNIFICANT CHANGE UP (ref 3.5–5.2)
ALP SERPL-CCNC: 57 U/L — SIGNIFICANT CHANGE UP (ref 30–115)
ALT FLD-CCNC: 21 U/L — SIGNIFICANT CHANGE UP (ref 0–41)
ANION GAP SERPL CALC-SCNC: 11 MMOL/L — SIGNIFICANT CHANGE UP (ref 7–14)
AST SERPL-CCNC: 23 U/L — SIGNIFICANT CHANGE UP (ref 0–41)
BILIRUB SERPL-MCNC: 0.5 MG/DL — SIGNIFICANT CHANGE UP (ref 0.2–1.2)
BUN SERPL-MCNC: 15 MG/DL — SIGNIFICANT CHANGE UP (ref 10–20)
CALCIUM SERPL-MCNC: 9.2 MG/DL — SIGNIFICANT CHANGE UP (ref 8.5–10.1)
CHLORIDE SERPL-SCNC: 106 MMOL/L — SIGNIFICANT CHANGE UP (ref 98–110)
CO2 SERPL-SCNC: 24 MMOL/L — SIGNIFICANT CHANGE UP (ref 17–32)
CREAT SERPL-MCNC: 1.1 MG/DL — SIGNIFICANT CHANGE UP (ref 0.7–1.5)
GLUCOSE SERPL-MCNC: 86 MG/DL — SIGNIFICANT CHANGE UP (ref 70–99)
HCT VFR BLD CALC: 46.2 % — SIGNIFICANT CHANGE UP (ref 42–52)
HGB BLD-MCNC: 15.4 G/DL — SIGNIFICANT CHANGE UP (ref 14–18)
MCHC RBC-ENTMCNC: 29.8 PG — SIGNIFICANT CHANGE UP (ref 27–31)
MCHC RBC-ENTMCNC: 33.3 G/DL — SIGNIFICANT CHANGE UP (ref 32–37)
MCV RBC AUTO: 89.4 FL — SIGNIFICANT CHANGE UP (ref 80–94)
NRBC # BLD: 0 /100 WBCS — SIGNIFICANT CHANGE UP (ref 0–0)
PLATELET # BLD AUTO: 194 K/UL — SIGNIFICANT CHANGE UP (ref 130–400)
POTASSIUM SERPL-MCNC: 4.3 MMOL/L — SIGNIFICANT CHANGE UP (ref 3.5–5)
POTASSIUM SERPL-SCNC: 4.3 MMOL/L — SIGNIFICANT CHANGE UP (ref 3.5–5)
PROT SERPL-MCNC: 6.5 G/DL — SIGNIFICANT CHANGE UP (ref 6–8)
RBC # BLD: 5.17 M/UL — SIGNIFICANT CHANGE UP (ref 4.7–6.1)
RBC # FLD: 12.6 % — SIGNIFICANT CHANGE UP (ref 11.5–14.5)
SODIUM SERPL-SCNC: 141 MMOL/L — SIGNIFICANT CHANGE UP (ref 135–146)
TROPONIN T SERPL-MCNC: <0.01 NG/ML — SIGNIFICANT CHANGE UP
TSH SERPL-MCNC: 1.55 UIU/ML — SIGNIFICANT CHANGE UP (ref 0.27–4.2)
TSH SERPL-MCNC: 3.49 UIU/ML — SIGNIFICANT CHANGE UP (ref 0.27–4.2)
WBC # BLD: 6.22 K/UL — SIGNIFICANT CHANGE UP (ref 4.8–10.8)
WBC # FLD AUTO: 6.22 K/UL — SIGNIFICANT CHANGE UP (ref 4.8–10.8)

## 2019-03-14 RX ADMIN — ENOXAPARIN SODIUM 40 MILLIGRAM(S): 100 INJECTION SUBCUTANEOUS at 21:47

## 2019-03-14 NOTE — PROGRESS NOTE ADULT - SUBJECTIVE AND OBJECTIVE BOX
INTERVAL HPI/OVERNIGHT EVENTS:  remains bradycardic and asymptomatic  CT done:  normal C's, dilated Ao root    MEDICATIONS  (STANDING):  enoxaparin Injectable 40 milliGRAM(s) SubCutaneous every 24 hours    MEDICATIONS  (PRN):  acetaminophen   Tablet .. 650 milliGRAM(s) Oral every 6 hours PRN Mild Pain (1 - 3)      Allergies    No Known Allergies    Intolerances    REVIEW OF SYSTEMS    [x ] A ten-point review of systems was otherwise negative except as noted.    Vital Signs Last 24 Hrs  T(C): 35.5 (14 Mar 2019 05:22), Max: 35.8 (13 Mar 2019 20:44)  T(F): 95.9 (14 Mar 2019 05:22), Max: 96.5 (13 Mar 2019 20:44)  HR: 57 (13 Mar 2019 20:44) (57 - 57)  BP: 123/74 (13 Mar 2019 20:44) (123/74 - 123/74)  BP(mean): --  RR: 20 (14 Mar 2019 05:22) (20 - 20)  SpO2: 97% (14 Mar 2019 06:35) (97% - 97%)      Physical Exam    PHYSICAL EXAM:    General/Neuro:  alert & oriented x 3, no focal deficits, PERRLA, EOMI    Lungs:      clear to auscultation, Normal expansion/effort. no wheezes/rhonchi/rales    Cardiovascular : S1, S2.  Bradycardic, Regular rhythm.     GI: Abdomen soft, Non-tender, Non-distended.  +BS    Extremities: Extremities warm, pink, well-perfused. no edema, Pulses:    Derm: Good skin turgor, no skin breakdown.          LABS:                        15.4   6.22  )-----------( 194      ( 14 Mar 2019 05:34 )             46.2     03-14    141  |  106  |  15  ----------------------------<  86  4.3   |  24  |  1.1    Ca    9.2      14 Mar 2019 05:34  Mg     2.0     03-13    TPro  6.5  /  Alb  4.1  /  TBili  0.5  /  DBili  x   /  AST  23  /  ALT  21  /  AlkPhos  57  03-14          RADIOLOGY & ADDITIONAL TESTS:    Transthoracic Echocardiogram (03.13.19 @ 15:46) >  Summary:   1. LV Ejection Fraction by Coronado's Method with a biplane EF of 64 %.   2. Borderline dilatation of the aortic root.   3. Pulmonic valve regurgitation.   4. Mild tricuspid regurgitation.      CT Heart with Coronaries (03.14.19 @ 10:01) >  IMPRESSION:    1.  Normal coronary arteries  2.  Total coronary calcium score is 0.     3.  Dilated aortic root, sinuses of Valsalva: 4.1 x 4 cm INTERVAL HPI/OVERNIGHT EVENTS:  remains bradycardic and asymptomatic  CT done:  normal C's, dilated Ao root    MEDICATIONS  (STANDING):  enoxaparin Injectable 40 milliGRAM(s) SubCutaneous every 24 hours    MEDICATIONS  (PRN):  acetaminophen   Tablet .. 650 milliGRAM(s) Oral every 6 hours PRN Mild Pain (1 - 3)      Allergies    No Known Allergies    Intolerances    REVIEW OF SYSTEMS    [x ] A ten-point review of systems was otherwise negative except as noted.    Vital Signs Last 24 Hrs  T(C): 35.5 (14 Mar 2019 05:22), Max: 35.8 (13 Mar 2019 20:44)  T(F): 95.9 (14 Mar 2019 05:22), Max: 96.5 (13 Mar 2019 20:44)  HR: 57 (13 Mar 2019 20:44) (57 - 57)  BP: 123/74 (13 Mar 2019 20:44) (123/74 - 123/74)  BP(mean): --  RR: 20 (14 Mar 2019 05:22) (20 - 20)  SpO2: 97% (14 Mar 2019 06:35) (97% - 97%)      Physical Exam    PHYSICAL EXAM:    General/Neuro:  alert & oriented x 3, no focal deficits, PERRLA, EOMI    Lungs:      clear to auscultation, Normal expansion/effort. no wheezes/rhonchi/rales    Cardiovascular : S1, S2.  Bradycardic, Regular rhythm.     GI: Abdomen soft, Non-tender, Non-distended.  +BS    Extremities: Extremities warm, pink, well-perfused. no edema, Pulses:    Derm: Good skin turgor, no skin breakdown.          LABS:                        15.4   6.22  )-----------( 194      ( 14 Mar 2019 05:34 )             46.2     03-14    141  |  106  |  15  ----------------------------<  86  4.3   |  24  |  1.1    Ca    9.2      14 Mar 2019 05:34  Mg     2.0     03-13    TPro  6.5  /  Alb  4.1  /  TBili  0.5  /  DBili  x   /  AST  23  /  ALT  21  /  AlkPhos  57  03-14      Thyroid Stimulating Hormone, Serum: 1.55 uIU/mL (03.13.19 @ 11:01)          RADIOLOGY & ADDITIONAL TESTS:    Transthoracic Echocardiogram (03.13.19 @ 15:46) >  Summary:   1. LV Ejection Fraction by Coronado's Method with a biplane EF of 64 %.   2. Borderline dilatation of the aortic root.   3. Pulmonic valve regurgitation.   4. Mild tricuspid regurgitation.      CT Heart with Coronaries (03.14.19 @ 10:01) >  IMPRESSION:    1.  Normal coronary arteries  2.  Total coronary calcium score is 0.     3.  Dilated aortic root, sinuses of Valsalva: 4.1 x 4 cm

## 2019-03-14 NOTE — PROGRESS NOTE ADULT - ASSESSMENT
50yo Male with no significant PMH, admitted with several syncopal episodes, found to be bradycardic on EKG/tele    symptomatic bradycardia  -con't tele  -daily EKG  -avoid AV carlton blockers  -Echo  -check TSH, T3, FT4  -maintain K>4.0, Mg >2.0  Coronary CTA  NPO for CTA  Cardiac MRI to r/o ARVD  Tilt test  EST  EEG done, f/u results 48yo Male with no significant PMH, admitted with several syncopal episodes, found to be bradycardic on EKG/tele    symptomatic bradycardia  -con't tele  -daily EKG  -avoid AV carlton blockers  -check  T3, FT4  -maintain K>4.0, Mg >2.0  awaiting Cardiac MRI to r/o ARVD  Awaiting Tilt test  EST today  EEG done, f/u results and neurology recs 48yo Male with no significant PMH, admitted with several syncopal episodes, found to be bradycardic on EKG/tele    symptomatic bradycardia  -con't tele  -daily EKG  -avoid AV carlton blockers  -check  T3, FT4  -maintain K>4.0, Mg >2.0  awaiting Cardiac MRI to r/o ARVD  Awaiting Tilt test  EST today  EEG done, normal, f/u neurology recs

## 2019-03-14 NOTE — PROGRESS NOTE ADULT - SUBJECTIVE AND OBJECTIVE BOX
HIGINIO SCHAFER 49y Male  MRN#: [id number]         SUBJECTIVE  Patient is a 49y old Male who presents with a chief complaint of Syncopal episodes (13 Mar 2019 14:59)  Currently admitted to medicine with the primary diagnosis of Syncope    Today is hospital day 2d, and this morning pt is resting comfortably and reports no overnight events. No recurrence of his syncopal episodes since admission.        OBJECTIVE  PAST MEDICAL & SURGICAL HISTORY  Syncopal episodes  H/O knee surgery    ALLERGIES:  No Known Allergies    MEDICATIONS:  STANDING MEDICATIONS  enoxaparin Injectable 40 milliGRAM(s) SubCutaneous every 24 hours    PRN MEDICATIONS  acetaminophen   Tablet .. 650 milliGRAM(s) Oral every 6 hours PRN      Home Medications:      VITAL SIGNS: Last 24 Hours  T(C): 35.5 (14 Mar 2019 05:22), Max: 35.8 (13 Mar 2019 20:44)  T(F): 95.9 (14 Mar 2019 05:22), Max: 96.5 (13 Mar 2019 20:44)  HR: 57 (13 Mar 2019 20:44) (57 - 57)  BP: 123/74 (13 Mar 2019 20:44) (123/74 - 123/74)  BP(mean): --  RR: 20 (14 Mar 2019 05:22) (20 - 20)  SpO2: 97% (14 Mar 2019 06:35) (97% - 97%)    LABS:                        15.4   6.22  )-----------( 194      ( 14 Mar 2019 05:34 )             46.2     03-14    141  |  106  |  15  ----------------------------<  86  4.3   |  24  |  1.1    Ca    9.2      14 Mar 2019 05:34  Mg     2.0     03-13    TPro  6.5  /  Alb  4.1  /  TBili  0.5  /  DBili  x   /  AST  23  /  ALT  21  /  AlkPhos  57  03-14          Troponin T, Serum: <0.01 ng/mL (03-14-19 @ 05:34)      CARDIAC MARKERS ( 14 Mar 2019 05:34 )  x     / <0.01 ng/mL / x     / x     / x      CARDIAC MARKERS ( 13 Mar 2019 01:11 )  x     / <0.01 ng/mL / 119 U/L / x     / 1.8 ng/mL  CARDIAC MARKERS ( 12 Mar 2019 10:58 )  x     / <0.01 ng/mL / x     / x     / x          I&O's Summary        PHYSICAL EXAM:    General: Not in distress.   HEENT: Moist mucus membranes. PERRLA.  Cardio: Regular rate and rhythm, S1, S2, no murmurs  Pulm: Clear to auscultation bilaterally. No wheezing, or rhonchi  Abdomen: Soft, non-tender, non-distended. Normoactive bowel sounds.  Extremities: No cyanosis or edema bilaterally.   Neuro: A&O x3. No focal deficits.       RADIOLOGY:    < from: Transthoracic Echocardiogram (03.13.19 @ 15:46) >  Summary:   1. LV Ejection Fraction by Coronado's Method with a biplane EF of 64 %.   2. Borderline dilatation of the aortic root.   3. Pulmonic valve regurgitation.   4. Mild tricuspid regurgitation.    < end of copied text >      	No acute fractures, mass effect, midline shift or hemorrhage.     	< end of copied text >    	< from: Xray Chest 1 View-PORTABLE IMMEDIATE (03.12.19 @ 11:15) >    	Impression:      	No consolidation, effusion or pneumothorax.    	< end of copied text > HIGINIO SCHAFER 49y Male  MRN#: [id number]         SUBJECTIVE  Patient is a 49y old Male who presents with a chief complaint of Syncopal episodes (13 Mar 2019 14:59)  Currently admitted to medicine with the primary diagnosis of Syncope    Today is hospital day 2d, and this morning pt is resting comfortably and reports no overnight events. No recurrence of his syncopal episodes since admission.        OBJECTIVE  PAST MEDICAL & SURGICAL HISTORY  Syncopal episodes  H/O knee surgery    ALLERGIES:  No Known Allergies    MEDICATIONS:  STANDING MEDICATIONS  enoxaparin Injectable 40 milliGRAM(s) SubCutaneous every 24 hours    PRN MEDICATIONS  acetaminophen   Tablet .. 650 milliGRAM(s) Oral every 6 hours PRN      Home Medications:      VITAL SIGNS: Last 24 Hours  T(C): 35.5 (14 Mar 2019 05:22), Max: 35.8 (13 Mar 2019 20:44)  T(F): 95.9 (14 Mar 2019 05:22), Max: 96.5 (13 Mar 2019 20:44)  HR: 57 (13 Mar 2019 20:44) (57 - 57)  BP: 123/74 (13 Mar 2019 20:44) (123/74 - 123/74)  BP(mean): --  RR: 20 (14 Mar 2019 05:22) (20 - 20)  SpO2: 97% (14 Mar 2019 06:35) (97% - 97%)    LABS:                        15.4   6.22  )-----------( 194      ( 14 Mar 2019 05:34 )             46.2     03-14    141  |  106  |  15  ----------------------------<  86  4.3   |  24  |  1.1    Ca    9.2      14 Mar 2019 05:34  Mg     2.0     03-13    TPro  6.5  /  Alb  4.1  /  TBili  0.5  /  DBili  x   /  AST  23  /  ALT  21  /  AlkPhos  57  03-14          Troponin T, Serum: <0.01 ng/mL (03-14-19 @ 05:34)      CARDIAC MARKERS ( 14 Mar 2019 05:34 )  x     / <0.01 ng/mL / x     / x     / x      CARDIAC MARKERS ( 13 Mar 2019 01:11 )  x     / <0.01 ng/mL / 119 U/L / x     / 1.8 ng/mL  CARDIAC MARKERS ( 12 Mar 2019 10:58 )  x     / <0.01 ng/mL / x     / x     / x          I&O's Summary        PHYSICAL EXAM:    General: Not in distress.   HEENT: Moist mucus membranes. PERRLA.  Cardio: Regular rate and rhythm, S1, S2, no murmurs  Pulm: Clear to auscultation bilaterally. No wheezing, or rhonchi  GI Soft, non-tender, non-distended. Normoactive bowel sounds.  Extremities: No cyanosis or edema bilaterally.   Neuro: A&O x3. No focal deficits.       RADIOLOGY:    < from: Transthoracic Echocardiogram (03.13.19 @ 15:46) >  Summary:   1. LV Ejection Fraction by Coronado's Method with a biplane EF of 64 %.   2. Borderline dilatation of the aortic root.   3. Pulmonic valve regurgitation.   4. Mild tricuspid regurgitation.    < end of copied text >      	No acute fractures, mass effect, midline shift or hemorrhage.     	< end of copied text >    	< from: Xray Chest 1 View-PORTABLE IMMEDIATE (03.12.19 @ 11:15) >    	Impression:      	No consolidation, effusion or pneumothorax.    	< end of copied text >

## 2019-03-14 NOTE — PROGRESS NOTE ADULT - ASSESSMENT
50 YO M with PMH of syncopal episodes in the past presents with 3 episodes of LOC. CT head, CXR negative, no witnessed seizure activity although the one episode that was witnessed had a facial grimace and some confusion after the event. Was isela in the ED with first deg  AV block. Labs WNL. Likely arrhythmia, no family Hx of arrhythmias or sudden cardiac death.    #Syncope possibly 2/2 cardiac etiology   -  C/w Tele monitoring: Bradycardia with 1st degree A-V block  - F/U Cardiology  -EP: CTA coronaries, Cardiac MRI w w/out  to r/o ARVD, If negative, pt will need Exercise stress test,  and Tilt table test. Follow up with EP  - 2-D Echo: LVEF 64%. Mild TR.  - CE -ve x3.  - Orthostatic VS -ve      VTE PPx: Lovenos SQ  Regular diet  Full code  From home, fully functional 50 YO M with PMH of syncopal episodes in the past presents with 3 episodes of LOC. CT head, CXR negative, no witnessed seizure activity although the one episode that was witnessed had a facial grimace and some confusion after the event. Was isela in the ED with first deg  AV block. Labs WNL. Likely arrhythmia, no family Hx of arrhythmias or sudden cardiac death.    #Syncope possibly 2/2 cardiac etiology   -  C/w Tele monitoring: Bradycardia with 1st degree A-V block  - F/U Cardiology  -EP: CTA coronaries, Cardiac MRI w w/out  to r/o ARVD, If negative, pt will need Exercise stress test,  and Tilt table test. Follow up with EP  - 2-D Echo: LVEF 64%. Mild TR.  - CE -ve x3.  - Orthostatic VS -ve  -Neuro consulted for 24-hr EEG to r/o Seizure as cause of syncope      VTE PPx: Lovenos SQ  Regular diet  Full code  From home, fully functional 48 YO M with PMH of syncopal episodes in the past presents with 3 episodes of LOC. CT head, CXR negative, no witnessed seizure activity although the one episode that was witnessed had a facial grimace and some confusion after the event. Was isela in the ED with first deg  AV block. Labs WNL. Likely arrhythmia, no family Hx of arrhythmias or sudden cardiac death.    #Syncope possibly 2/2 cardiac etiology   -  C/w Tele monitoring: Bradycardia with 1st degree A-V block  - F/U Cardiology  -EP: CTA coronaries, Cardiac MRI w w/out  to r/o ARVD, If negative, pt will need Exercise stress test,  and Tilt table test. Follow up with EP  - 2-D Echo: LVEF 64%. Mild TR.  - CE -ve x3.  - Orthostatic VS -ve  -Neuro consulted for 24-hr EEG to r/o Seizure as cause of syncope.       VTE PPx: Lovenos SQ  Regular diet  Full code  From home, fully functional 48 YO M with PMH of syncopal episodes in the past presents with 3 episodes of LOC. CT head, CXR negative, no witnessed seizure activity although the one episode that was witnessed had a facial grimace and some confusion after the event. Was isela in the ED with first deg  AV block. Labs WNL. Likely arrhythmia, no family Hx of arrhythmias or sudden cardiac death.    secondary  cardiac etiology   -  C/w Tele monitoring: Bradycardia with 1st degree A-V block  - F/U Cardiology  -EP: CTA coronaries, Cardiac MRI w w/out  to r/o ARVD, If negative, pt will need Exercise stress test,  and Tilt table test. Follow up with EP  - 2-D Echo: LVEF 64%. Mild TR.  - CE -ve x3.  - Orthostatic VS -ve  -Neuro consulted for 24-hr EEG to r/o Seizure as cause of syncope.       VTE PPx: Lovenos SQ  Regular diet  Full code  From home, fully functional    #Progress Note Handoff  Pending (specify):  Consults_________, Tests__MRI heart______, Test Results___CT coronary ____, Other_________  Family discussion: patient alert oriented and plan of care was discussed with him in details and he agrees with above plan   Disposition: Home_##__/SNF___/Other________/Unknown at this time________

## 2019-03-14 NOTE — PROGRESS NOTE ADULT - ATTENDING COMMENTS
patient seen and examined independently
No arrhythmias on telemetry  EST normal  TTT negative  Awaiting cardiac MRI  If MRI negative for ARVD, will plan Loop implant
patient seen and examined independently

## 2019-03-15 ENCOUNTER — TRANSCRIPTION ENCOUNTER (OUTPATIENT)
Age: 49
End: 2019-03-15

## 2019-03-15 VITALS
RESPIRATION RATE: 17 BRPM | DIASTOLIC BLOOD PRESSURE: 67 MMHG | TEMPERATURE: 97 F | HEART RATE: 52 BPM | SYSTOLIC BLOOD PRESSURE: 122 MMHG

## 2019-03-15 LAB
ALBUMIN SERPL ELPH-MCNC: 4 G/DL — SIGNIFICANT CHANGE UP (ref 3.5–5.2)
ALP SERPL-CCNC: 56 U/L — SIGNIFICANT CHANGE UP (ref 30–115)
ALT FLD-CCNC: 30 U/L — SIGNIFICANT CHANGE UP (ref 0–41)
ANION GAP SERPL CALC-SCNC: 11 MMOL/L — SIGNIFICANT CHANGE UP (ref 7–14)
AST SERPL-CCNC: 32 U/L — SIGNIFICANT CHANGE UP (ref 0–41)
BILIRUB SERPL-MCNC: 0.5 MG/DL — SIGNIFICANT CHANGE UP (ref 0.2–1.2)
BUN SERPL-MCNC: 17 MG/DL — SIGNIFICANT CHANGE UP (ref 10–20)
CALCIUM SERPL-MCNC: 8.8 MG/DL — SIGNIFICANT CHANGE UP (ref 8.5–10.1)
CHLORIDE SERPL-SCNC: 108 MMOL/L — SIGNIFICANT CHANGE UP (ref 98–110)
CO2 SERPL-SCNC: 24 MMOL/L — SIGNIFICANT CHANGE UP (ref 17–32)
CREAT SERPL-MCNC: 1.1 MG/DL — SIGNIFICANT CHANGE UP (ref 0.7–1.5)
GLUCOSE SERPL-MCNC: 89 MG/DL — SIGNIFICANT CHANGE UP (ref 70–99)
HCT VFR BLD CALC: 45 % — SIGNIFICANT CHANGE UP (ref 42–52)
HGB BLD-MCNC: 15 G/DL — SIGNIFICANT CHANGE UP (ref 14–18)
MCHC RBC-ENTMCNC: 29.5 PG — SIGNIFICANT CHANGE UP (ref 27–31)
MCHC RBC-ENTMCNC: 33.3 G/DL — SIGNIFICANT CHANGE UP (ref 32–37)
MCV RBC AUTO: 88.6 FL — SIGNIFICANT CHANGE UP (ref 80–94)
NRBC # BLD: 0 /100 WBCS — SIGNIFICANT CHANGE UP (ref 0–0)
PLATELET # BLD AUTO: 197 K/UL — SIGNIFICANT CHANGE UP (ref 130–400)
POTASSIUM SERPL-MCNC: 4.4 MMOL/L — SIGNIFICANT CHANGE UP (ref 3.5–5)
POTASSIUM SERPL-SCNC: 4.4 MMOL/L — SIGNIFICANT CHANGE UP (ref 3.5–5)
PROT SERPL-MCNC: 6.2 G/DL — SIGNIFICANT CHANGE UP (ref 6–8)
RBC # BLD: 5.08 M/UL — SIGNIFICANT CHANGE UP (ref 4.7–6.1)
RBC # FLD: 12.5 % — SIGNIFICANT CHANGE UP (ref 11.5–14.5)
SODIUM SERPL-SCNC: 143 MMOL/L — SIGNIFICANT CHANGE UP (ref 135–146)
WBC # BLD: 5.53 K/UL — SIGNIFICANT CHANGE UP (ref 4.8–10.8)
WBC # FLD AUTO: 5.53 K/UL — SIGNIFICANT CHANGE UP (ref 4.8–10.8)

## 2019-03-15 RX ADMIN — Medication 650 MILLIGRAM(S): at 06:16

## 2019-03-15 NOTE — DISCHARGE NOTE PROVIDER - NSDCCPCAREPLAN_GEN_ALL_CORE_FT
PRINCIPAL DISCHARGE DIAGNOSIS  Diagnosis: Syncope  Assessment and Plan of Treatment: You had extensive work up by Cardiology and Neurology that came back negative. Your CT coronary and MRI of the heart were negative for any significant structural cause sof syncope, and you had an EEG that was negative for any seizure activity. Follow up with Dr. Fernandes (EP) as an outpt for palcement of a loop recorder if applicable.

## 2019-03-15 NOTE — PROGRESS NOTE ADULT - ASSESSMENT
Assessment: 50 YO M with PMH of syncopal episodes in the past presents with 3 episodes of LOC. CT head, CXR negative, no witnessed seizure activity although the one episode that was witnessed had a facial grimace and some confusion after the event. Was isela in the ED with first deg  AV block. Labs WNL. No family Hx of arrhythmias or sudden cardiac death. Cardiac MRI normal; pt asymptomatic at this time.    Plan:  Syncope  - Cardiac MRI neg  - Offered implantable loop recorder to patient during this admission but he refused at this time and wants to think about it  - Pt will follow up with Dr Moseley in office  - Cont current medical therapy

## 2019-03-15 NOTE — PROGRESS NOTE ADULT - SUBJECTIVE AND OBJECTIVE BOX
HIGINIO SCHAFER 49y Male  MRN#: [id number]         SUBJECTIVE  Patient is a 49y old Male who presents with a chief complaint of Syncopal episodes (15 Mar 2019 09:37)  Currently admitted to medicine with the primary diagnosis of Syncope    Today is hospital day 3d, and this morning pt is resting comfortably s/p coronary CT and Treadmill stress test yesterday that were both negative.        OBJECTIVE  PAST MEDICAL & SURGICAL HISTORY  Syncopal episodes  H/O knee surgery    ALLERGIES:  No Known Allergies    MEDICATIONS:  STANDING MEDICATIONS  enoxaparin Injectable 40 milliGRAM(s) SubCutaneous every 24 hours    PRN MEDICATIONS  acetaminophen   Tablet .. 650 milliGRAM(s) Oral every 6 hours PRN      Home Medications:      VITAL SIGNS: Last 24 Hours  T(C): 35.8 (15 Mar 2019 05:15), Max: 36.3 (14 Mar 2019 13:31)  T(F): 96.5 (15 Mar 2019 05:15), Max: 97.3 (14 Mar 2019 13:31)  HR: 43 (15 Mar 2019 05:15) (43 - 50)  BP: 117/61 (15 Mar 2019 05:15) (117/61 - 151/70)  BP(mean): --  RR: 18 (15 Mar 2019 05:15) (18 - 18)  SpO2: --    LABS:                        15.0   5.53  )-----------( 197      ( 15 Mar 2019 05:38 )             45.0     03-15    143  |  108  |  17  ----------------------------<  89  4.4   |  24  |  1.1    Ca    8.8      15 Mar 2019 05:38    TPro  6.2  /  Alb  4.0  /  TBili  0.5  /  DBili  x   /  AST  32  /  ALT  30  /  AlkPhos  56  03-15              CARDIAC MARKERS ( 14 Mar 2019 05:34 )  x     / <0.01 ng/mL / x     / x     / x          I&O's Summary        PHYSICAL EXAM:    General: Not in distress.   HEENT: Moist mucus membranes. PERRLA.  Cardio: Regular rate and rhythm, S1, S2, no murmur, rub, or gallop.  Pulm: Clear to auscultation bilaterally. No wheezing, or rhonchi  Abdomen: Soft, non-tender, non-distended. Normoactive bowel sounds.  Extremities: No cyanosis or edema bilaterally. No calf tenderness to palpation.  Neuro: A&O x3. No focal deficits.       RADIOLOGY:    < from: CT Heart with Coronaries (03.14.19 @ 10:01) >    IMPRESSION:    1.  Normal coronary arteries  2.  Total coronary calcium score is 0.     3.  Dilated aortic root, sinuses of Valsalva: 4.1 x 4 cm    < end of copied text >        < from: Transthoracic Echocardiogram (03.13.19 @ 15:46) >  Summary:   1. LV Ejection Fraction by Coronado's Method with a biplane EF of 64 %.   2. Borderline dilatation of the aortic root.   3. Pulmonic valve regurgitation.   4. Mild tricuspid regurgitation.    < end of copied text >      No acute fractures, mass effect, midline shift or hemorrhage.     < end of copied text >    < from: Xray Chest 1 View-PORTABLE IMMEDIATE (03.12.19 @ 11:15) >    Impression:      No consolidation, effusion or pneumothorax.    < end of copied text >

## 2019-03-15 NOTE — PROGRESS NOTE ADULT - SUBJECTIVE AND OBJECTIVE BOX
INTERVAL HPI/OVERNIGHT EVENTS: No acute events overnight; pt feeling well    MEDICATIONS  (STANDING):  enoxaparin Injectable 40 milliGRAM(s) SubCutaneous every 24 hours    MEDICATIONS  (PRN):  acetaminophen   Tablet .. 650 milliGRAM(s) Oral every 6 hours PRN Mild Pain (1 - 3)      Allergies    No Known Allergies    Intolerances        REVIEW OF SYSTEMS: Denies CP, palpitations, dizziness or SOB    Vital Signs Last 24 Hrs  T(C): 36.2 (15 Mar 2019 14:06), Max: 36.2 (15 Mar 2019 14:06)  T(F): 97.2 (15 Mar 2019 14:06), Max: 97.2 (15 Mar 2019 14:06)  HR: 52 (15 Mar 2019 14:06) (43 - 52)  BP: 122/67 (15 Mar 2019 14:06) (117/61 - 151/70)  BP(mean): --  RR: 17 (15 Mar 2019 14:06) (17 - 18)  SpO2: --      Physical Exam  GENERAL: In no apparent distress, well nourished, and hydrated.  HEART: Regular rate and rhythm; No murmurs, rubs, or gallops.  PULMONARY: Clear to auscultation and perfusion.  No rales, wheezing, or rhonchi bilaterally.  ABDOMEN: Soft, Nontender, Nondistended; Bowel sounds present  EXTREMITIES:  2+ Peripheral Pulses, No clubbing, cyanosis, or edema    LABS:                        15.0   5.53  )-----------( 197      ( 15 Mar 2019 05:38 )             45.0     03-15    143  |  108  |  17  ----------------------------<  89  4.4   |  24  |  1.1    Ca    8.8      15 Mar 2019 05:38    TPro  6.2  /  Alb  4.0  /  TBili  0.5  /  DBili  x   /  AST  32  /  ALT  30  /  AlkPhos  56  03-15          RADIOLOGY & ADDITIONAL TESTS:   12 Lead ECG (03.12.19 @ 10:20)    Ventricular Rate 42 BPM    Atrial Rate 42 BPM    P-R Interval 224 ms    QRS Duration 92 ms    Q-T Interval 456 ms    QTC Calculation(Bezet) 380 ms    P Axis 39 degrees    R Axis 33 degrees    T Axis 26 degrees    Diagnosis Line Marked sinus bradycardia with sinus arrhythmia with 1st degree A-V block  Abnormal ECG    Confirmed by DAVIS WIN MD ([id number]) on 3/12/2019 2:48:50 PM    Xray Chest 1 View-PORTABLE IMMEDIATE (03.12.19 @ 11:15)  EXAM:  XR CHEST PORTABLE IMMED 1V            PROCEDURE DATE:  03/12/2019      INTERPRETATION:  Clinical History / Reason for exam: Syncope    Comparison : Chest radiograph None.    Technique/Positioning: Single AP view.    Findings:    Support devices: Telemetry leads overlie chest    Cardiac/mediastinum/hilum: Unremarkable.    Lung parenchyma/Pleura: No consolidation, effusion or pneumothorax.    Skeleton/soft tissues: No acute pneumonia.    Impression:      No consolidation, effusion orpneumothorax.      ELLIOT LANDAU M.D., ATTENDING RADIOLOGIST  This document has been electronically signed. Mar 12 2019  2:25PM

## 2019-03-15 NOTE — DISCHARGE NOTE PROVIDER - HOSPITAL COURSE
48 YO M with PMH of syncopal episodes in the past presents with 3 episodes of LOC. CT head, CXR negative, no witnessed seizure activity although the one episode that was witnessed had a facial grimace and some confusion after the event. Was isela in the ED with first deg  AV block. Labs WNL. Likely arrhythmia, no family Hx of arrhythmias or sudden cardiac death.        #Syncope possibly secondary to cardiac etiology     -  C/w Tele monitoring: Bradycardia with 1st degree A-V block    - CT coronaries: Normal coronary arteries. Treadmill stress test: negative    - Cardiac MRI: negative    - 2-D Echo: LVEF 64%. Mild TR.    - CE -ve x3.    - Orthostatic VS -ve    - 24-hr EEG negative    Follow up with EP as outpt. PT was offered Loop recorder but would like     to think about it.         VTE PPx: Lovenos SQ    Regular diet    Full code    From home, fully functional

## 2019-03-15 NOTE — PROGRESS NOTE ADULT - ASSESSMENT
48 YO M with PMH of syncopal episodes in the past presents with 3 episodes of LOC. CT head, CXR negative, no witnessed seizure activity although the one episode that was witnessed had a facial grimace and some confusion after the event. Was isela in the ED with first deg  AV block. Labs WNL. Likely arrhythmia, no family Hx of arrhythmias or sudden cardiac death.    #Syncope possibly secondary to cardiac etiology   -  C/w Tele monitoring: Bradycardia with 1st degree A-V block  - CT coronaries: Normal coronary arteries. Treadmill stress test: negative  - F/u Cardiac MRI w w/out  to r/o ARVD, If negative, EP planning to place a loop recorder.  - 2-D Echo: LVEF 64%. Mild TR.  - CE -ve x3.  - Orthostatic VS -ve  - F/u 24-hr EEG results to r/o seizure as etiology. Performed overnight.      VTE PPx: Lovenos SQ  Regular diet  Full code  From home, fully functional

## 2019-03-15 NOTE — DISCHARGE NOTE PROVIDER - CARE PROVIDER_API CALL
Barak Moseley (MD)  Cardiac Electrophysiology; Cardiovascular Disease; [facility]  [address] [place] [address]  Phone: [phone]  Fax: [phone]  Follow Up Time:

## 2019-03-15 NOTE — DISCHARGE NOTE NURSING/CASE MANAGEMENT/SOCIAL WORK - NSDCDPATPORTLINK_GEN_ALL_CORE
You can access the [facility], offered by [facility], by registering with the following website: [email or web address]

## 2019-03-15 NOTE — PROGRESS NOTE ADULT - ASSESSMENT
48 YO M with PMH of syncopal episodes in the past presents with 3 episodes of LOC. CT head, CXR negative, no witnessed seizure activity although the one episode that was witnessed had a facial grimace and some confusion after the event. Was bradycardic in ED     #Syncope unknown etiology   -  C/w Tele monitoring at this time  - Echo normal   - stress test normal   -MRI heart pending   -per EP if MRI negative then plan for loop recorder     VTE PPx: Lovenos SQ  Regular diet  Full code  From home, fully functional    discharge if MRI neg then will ask EP if loop can be done as OPT as per patient request     #Progress Note Handoff  Pending (specify):  Consults___ ______, Tests___MRI heart  Family discussion:discussed with patient who is alert and oriented agrees with above plan   Disposition: Home_##__/SNF___/Other________/Unknown at this time________ 50 YO M with PMH of syncopal episodes in the past presents with 3 episodes of LOC. CT head, CXR negative, no witnessed seizure activity although the one episode that was witnessed had a facial grimace and some confusion after the event. Was bradycardic in ED     #Syncope unknown etiology   -  C/w Tele monitoring at this time  - Echo normal   - stress test normal   -MRI heart pending   -per EP if MRI negative then plan for loop recorder but patient does not want loop recorder at this time he wants to be discharged if MRI heart is negative and to think about getting a loop recorder as an OPT if he decides on doing it. I did explain the indication for the loop if syncope would happen again it will record any kind of arrythmia that could possibly cause the syncope and he expressed understanding and does not want a loop at this time     VTE PPx: Lovenos SQ  Regular diet  Full code  From home, fully functional    discharge if MRI neg then will ask EP if loop can be done as OPT as per patient request     #Progress Note Handoff  Pending (specify):  Consults___ ______, Tests___MRI heart  Family discussion:discussed with patient who is alert and oriented agrees with above plan   Disposition: Home_##__/SNF___/Other________/Unknown at this time________ 48 YO M with PMH of syncopal episodes in the past presents with 3 episodes of LOC. CT head, CXR negative, no witnessed seizure activity although the one episode that was witnessed had a facial grimace and some confusion after the event. Was bradycardic in ED     #Syncope unknown etiology   -  C/w Tele monitoring at this time  - Echo normal   - stress test normal   -MRI heart pending   -per EP if MRI negative then plan for loop recorder but patient does not want loop recorder at this time he wants to be discharged if MRI heart is negative and to think about getting a loop recorder as an OPT if he decides on doing it. I did explain the indication for the loop if syncope would happen again it will record any kind of arrythmia that could possibly cause the syncope and he expressed understanding and does not want a loop at this time     VTE PPx: Lovenos SQ  Regular diet  Full code  From home, fully functional    discharge if MRI heart negative     #Progress Note Handoff  Pending (specify):  Consults___ ______, Tests___MRI heart  Family discussion:discussed with patient who is alert and oriented agrees with above plan   Disposition: Home_##__/SNF___/Other________/Unknown at this time________

## 2019-03-15 NOTE — PROGRESS NOTE ADULT - REASON FOR ADMISSION
Syncopal episodes

## 2019-03-15 NOTE — PROGRESS NOTE ADULT - SUBJECTIVE AND OBJECTIVE BOX
no chest pain   no sob   Vital Signs Last 24 Hrs  T(C): 35.8 (15 Mar 2019 05:15), Max: 36.3 (14 Mar 2019 13:31)  T(F): 96.5 (15 Mar 2019 05:15), Max: 97.3 (14 Mar 2019 13:31)  HR: 43 (15 Mar 2019 05:15) (43 - 50)  BP: 117/61 (15 Mar 2019 05:15) (117/61 - 151/70)  BP(mean): --  RR: 18 (15 Mar 2019 05:15) (18 - 18)  SpO2: --    PHYSICAL EXAM:  GENERAL: NAD, well-developed  HEAD:  Atraumatic, Normocephalic  EYES: EOMI, PERRLA, conjunctiva and sclera clear  NECK: Supple, No JVD  CHEST/LUNG: Clear to auscultation bilaterally; No wheeze  CV: S1, S2 Arvind , No murmurs, rubs, or gallops  GI: Soft, Nontender, Nondistended; Bowel sounds present  EXTREMITIES:  2+ Peripheral Pulses, No clubbing, cyanosis, or edema  PSYCH: AAOx3  NEUROLOGY: non-focal  SKIN: No rashes or lesions                          15.0   5.53  )-----------( 197      ( 15 Mar 2019 05:38 )             45.0     03-15    143  |  108  |  17  ----------------------------<  89  4.4   |  24  |  1.1    Ca    8.8      15 Mar 2019 05:38    TPro  6.2  /  Alb  4.0  /  TBili  0.5  /  DBili  x   /  AST  32  /  ALT  30  /  AlkPhos  56  03-15    LIVER FUNCTIONS - ( 15 Mar 2019 05:38 )  Alb: 4.0 g/dL / Pro: 6.2 g/dL / ALK PHOS: 56 U/L / ALT: 30 U/L / AST: 32 U/L / GGT: x             CARDIAC MARKERS ( 14 Mar 2019 05:34 )  x     / <0.01 ng/mL / x     / x     / x

## 2019-03-15 NOTE — PROGRESS NOTE ADULT - SUBJECTIVE AND OBJECTIVE BOX
Epilepsy Attending Note:     HIGINIO SCHAFER    49y Male  MRN [id number]    Vital Signs Last 24 Hrs  T(C): 35.8 (15 Mar 2019 05:15), Max: 36.3 (14 Mar 2019 13:31)  T(F): 96.5 (15 Mar 2019 05:15), Max: 97.3 (14 Mar 2019 13:31)  HR: 43 (15 Mar 2019 05:15) (43 - 50)  BP: 117/61 (15 Mar 2019 05:15) (117/61 - 151/70)  BP(mean): --  RR: 18 (15 Mar 2019 05:15) (18 - 18)  SpO2: --                          15.0   5.53  )-----------( 197      ( 15 Mar 2019 05:38 )             45.0       03-15    143  |  108  |  17  ----------------------------<  89  4.4   |  24  |  1.1    Ca    8.8      15 Mar 2019 05:38    TPro  6.2  /  Alb  4.0  /  TBili  0.5  /  DBili  x   /  AST  32  /  ALT  30  /  AlkPhos  56  03-15      MEDICATIONS  (STANDING):  enoxaparin Injectable 40 milliGRAM(s) SubCutaneous every 24 hours    MEDICATIONS  (PRN):  acetaminophen   Tablet .. 650 milliGRAM(s) Oral every 6 hours PRN Mild Pain (1 - 3)            VEEG in the last 24 hours:    Background------8-9 hz    Focal and generalized slowing-----none    Interictal activity--none    Events---none    Seizures---none    Impression:   normal A/D/S v-EEG X 24 hours    Plan - discussed with the neurology           DC the monitoring

## 2019-03-19 DIAGNOSIS — R00.1 BRADYCARDIA, UNSPECIFIED: ICD-10-CM

## 2019-03-19 DIAGNOSIS — R55 SYNCOPE AND COLLAPSE: ICD-10-CM

## 2019-03-19 DIAGNOSIS — I44.0 ATRIOVENTRICULAR BLOCK, FIRST DEGREE: ICD-10-CM

## 2019-03-20 PROBLEM — R55 SYNCOPE AND COLLAPSE: Chronic | Status: ACTIVE | Noted: 2019-03-12

## 2019-04-26 PROBLEM — Z00.00 ENCOUNTER FOR PREVENTIVE HEALTH EXAMINATION: Status: ACTIVE | Noted: 2019-04-26

## 2019-05-13 ENCOUNTER — APPOINTMENT (OUTPATIENT)
Dept: CARDIOLOGY | Facility: CLINIC | Age: 49
End: 2019-05-13

## 2021-03-04 ENCOUNTER — APPOINTMENT (OUTPATIENT)
Dept: CARDIOLOGY | Facility: CLINIC | Age: 51
End: 2021-03-04
Payer: COMMERCIAL

## 2021-03-04 VITALS
HEART RATE: 67 BPM | SYSTOLIC BLOOD PRESSURE: 110 MMHG | TEMPERATURE: 97.3 F | HEIGHT: 72 IN | BODY MASS INDEX: 32.23 KG/M2 | DIASTOLIC BLOOD PRESSURE: 80 MMHG | WEIGHT: 238 LBS

## 2021-03-04 DIAGNOSIS — G47.33 OBSTRUCTIVE SLEEP APNEA (ADULT) (PEDIATRIC): ICD-10-CM

## 2021-03-04 DIAGNOSIS — Z99.89 OBSTRUCTIVE SLEEP APNEA (ADULT) (PEDIATRIC): ICD-10-CM

## 2021-03-04 DIAGNOSIS — I48.91 UNSPECIFIED ATRIAL FIBRILLATION: ICD-10-CM

## 2021-03-04 PROCEDURE — 99072 ADDL SUPL MATRL&STAF TM PHE: CPT

## 2021-03-04 PROCEDURE — 99205 OFFICE O/P NEW HI 60 MIN: CPT

## 2021-03-04 PROCEDURE — 93000 ELECTROCARDIOGRAM COMPLETE: CPT

## 2021-03-04 RX ORDER — KRILL/OM-3/DHA/EPA/PHOSPHO/AST 1000-230MG
CAPSULE ORAL
Refills: 0 | Status: ACTIVE | COMMUNITY

## 2021-03-04 RX ORDER — FLUTICASONE PROPIONATE 50 UG/1
50 SPRAY, METERED NASAL DAILY
Qty: 1 | Refills: 5 | Status: ACTIVE | COMMUNITY

## 2021-03-09 LAB
ESTIMATED AVERAGE GLUCOSE: 111 MG/DL
HBA1C MFR BLD HPLC: 5.5 %
TSH SERPL-ACNC: 1.98 UIU/ML

## 2021-04-27 ENCOUNTER — APPOINTMENT (OUTPATIENT)
Dept: CARDIOLOGY | Facility: CLINIC | Age: 51
End: 2021-04-27

## 2021-05-04 ENCOUNTER — APPOINTMENT (OUTPATIENT)
Dept: CARDIOLOGY | Facility: CLINIC | Age: 51
End: 2021-05-04

## 2023-12-11 NOTE — PATIENT PROFILE ADULT - FUNCTIONAL SCREEN CURRENT LEVEL: BATHING, MLM
[ ] Pain management  [ ] Continue PPI  [ ] Antibiotics   [ ] Monitor labs LFTs:  [ ] Bili- normal at present.  [ ] AST-normal  [ ] ALT slightly elevated 56   [ May discharge home  [ ] F.U with outpatient GI 0 = independent